# Patient Record
Sex: FEMALE | Race: BLACK OR AFRICAN AMERICAN
[De-identification: names, ages, dates, MRNs, and addresses within clinical notes are randomized per-mention and may not be internally consistent; named-entity substitution may affect disease eponyms.]

---

## 2020-01-14 ENCOUNTER — HOSPITAL ENCOUNTER (INPATIENT)
Dept: HOSPITAL 74 - JLDR | Age: 40
LOS: 3 days | Discharge: HOME | End: 2020-01-17
Attending: OBSTETRICS & GYNECOLOGY | Admitting: OBSTETRICS & GYNECOLOGY
Payer: OTHER GOVERNMENT

## 2020-01-14 VITALS — BODY MASS INDEX: 39 KG/M2

## 2020-01-14 DIAGNOSIS — Z3A.37: ICD-10-CM

## 2020-01-14 DIAGNOSIS — O34.219: Primary | ICD-10-CM

## 2020-01-14 LAB
ANION GAP SERPL CALC-SCNC: 6 MMOL/L (ref 8–16)
APTT BLD: 26.7 SECONDS (ref 25.2–36.5)
BASOPHILS # BLD: 0.4 % (ref 0–2)
BUN SERPL-MCNC: 11.4 MG/DL (ref 7–18)
CALCIUM SERPL-MCNC: 9.3 MG/DL (ref 8.5–10.1)
CHLORIDE SERPL-SCNC: 107 MMOL/L (ref 98–107)
CO2 SERPL-SCNC: 23 MMOL/L (ref 21–32)
CREAT SERPL-MCNC: 0.8 MG/DL (ref 0.55–1.3)
DEPRECATED RDW RBC AUTO: 17 % (ref 11.6–15.6)
EOSINOPHIL # BLD: 1 % (ref 0–4.5)
GLUCOSE SERPL-MCNC: 60 MG/DL (ref 74–106)
HCT VFR BLD CALC: 36.9 % (ref 32.4–45.2)
HGB BLD-MCNC: 12.7 GM/DL (ref 10.7–15.3)
INR BLD: 0.95 (ref 0.83–1.09)
LYMPHOCYTES # BLD: 31.1 % (ref 8–40)
MCH RBC QN AUTO: 26.9 PG (ref 25.7–33.7)
MCHC RBC AUTO-ENTMCNC: 34.5 G/DL (ref 32–36)
MCV RBC: 78.2 FL (ref 80–96)
MONOCYTES # BLD AUTO: 10.2 % (ref 3.8–10.2)
NEUTROPHILS # BLD: 57.3 % (ref 42.8–82.8)
PLATELET # BLD AUTO: 297 K/MM3 (ref 134–434)
PMV BLD: 8.1 FL (ref 7.5–11.1)
POTASSIUM SERPLBLD-SCNC: 4.2 MMOL/L (ref 3.5–5.1)
PT PNL PPP: 11.2 SEC (ref 9.7–13)
RBC # BLD AUTO: 4.72 M/MM3 (ref 3.6–5.2)
SODIUM SERPL-SCNC: 137 MMOL/L (ref 136–145)
WBC # BLD AUTO: 11 K/MM3 (ref 4–10)

## 2020-01-14 RX ADMIN — Medication SCH MLS/HR: at 23:10

## 2020-01-14 NOTE — OP
Operative Note





- Note:


Operative Date: 01/14/20


Pre-Operative Diagnosis: repeat c s in labor


Operation: repeat lt c s


Post-Operative Diagnosis: Same as Pre-op


Surgeon: Drew Titus


Assistant: Carlos Mata


Anesthesiologist/CRNA: Alyssa Arce


Anesthesia: Spinal


Estimated Blood Loss (mls): 700 (no complications )


Operative Report Dictated: Yes

## 2020-01-14 NOTE — HP
Past Medical History





- Admission


Chief Complaint: in labor pain


History Source: Patient


Limitations to Obtaining History: No Limitations





- Past Medical History


CNS: No: Alzheimer's, CVA, Dementia, Migraine, Multiple Sclerosis, Peripheral 

Neuropathy, Parkinson's, Seizure, Syncope, TIA, Vertigo, Other


Cardiovascular: No: AFIB, Aneurysm, Aortic Insufficiency, Aortic Stenosis, CAD, 

CHF, Deep Vein Thrombosis, HTN, Hyperlipdemia, MI, Mitral Insufficiency, Mitral 

Stenosis, Murmur, Pulmonary Hypertension, Other


Pulmonary: No: Asthma, Bronchitis, Cancer, COPD, O2 Dependent, Pneumonia, 

Previously Intubated, Pulmonary Embolus, Pulmonary Fibrosis, Sleep Apnea, Other


Gastrointestinal: No: Ascites, Cancer, Constipation, Crohn's Disease, 

Diverticulitis, Diverticulosis, Esophageal Varices, Gastritis, GERD, GI Bleed, 

Hemorrhoids, Hiatal Hernia, Inflamatory Bowel Disease, Irritable Bowel Disease, 

Pancreatitis, Peptic Ulcer Disease, Ulcerative Colitis, Other


Hepatobiliary: No: Cirrhosis, Cholelithiasis, Cholecystitis, Choledocholithiasis

, Hepatitis A, Hepatitis B, Hepatitis C, Other


Renal/: No: Renal Failure, Renal Inusuff, BPH, Cancer, Hematuria, Hemodialysis

, Neurogenic Bladder, Renal Calculi, UTI, Other


Reproductive: No: Ectopic Pregnancy, Endometriosis, Fibroids, PID, Polycystic 

Ovary Syndrome, Postmenopausal, Other


...: 2


...Para: 1


...Term: 1


...: 0


...Spon : 0


...Induced : 0


...Multiple Gestation: 0


... Weeks Gestation by Dates: 37


...EDC by Sono: 20


Heme/Onc: No: Anemia, B12 Deficiency, Bleeding Disorder, Cancer, Current 

Chemotherapy, Current Radiation Therapy, Hemochromatosis, Hypercoaguable State, 

Myeloproliferative Synd, Sickle Cell Disease, Sickle Cell Trait, 

Thrombocytopenia, Other


Infectious Disease: No: AIDS, C-Diff, Herpes Zoster, HIV, MRSA, STD's, 

Tuberculosis, VREF, Other


Psych: No: Addictions, Anxiety, Bipolar, Depression, Panic, Psychosis, 

Schizophrenia, Other


Musculoskeletal: No: Bursitis, Chronic low back pain, Hemiparesis, Hemiplegia, 

Osteoarthritis, Paraplegia, Other


Rheumatology: No: Fibromyalgia, Gout, Lupus, Rheumatoid Arthritis, Sarcoidosis, 

Vasculitis, Other


ENT: No: Allergic Rhinitis, Sinusitis, Other


Endocrine: No: Orondo's Disease, Cushing's Disease, Diabetes Insipidus, 

Diabetes Mellitus, Hyperparathyroidism, Hyperthyroidism, Hypothyroidism, 

Osteopenia, SIADH, Other


Dermatology: No: Basal Cell, Cellulitis, Eczema, Melanoma, Psoriasis, Squamous 

Cell, Other





- Past Surgical History


Past Surgical History: Yes: 


Hx Myomectomy: No


Hx Transabdominal Cerclage: No





- Advance Directives


Advance Directives: Yes: Living Will





- Smoking History


Smoking history: Never smoked


Have you smoked in the past 12 months: No





- Alcohol/Substance Use


Hx Alcohol Use: No


History of Substance Use: reports: None





- Social History


Usual Living Arrangement: Yes: With Spouse


Do you think of yourself as: Straight/Heterosexual


ADL: Independent


History of Recent Travel: No





Home Medications





- Allergies


Allergies/Adverse Reactions: 


 Allergies











Allergy/AdvReac Type Severity Reaction Status Date / Time


 


No Known Allergies Allergy   Verified 20 17:53














- Home Medications


Home Medications: 


Ambulatory Orders





Acetaminophen/Diphenhydramine [Tylenol Pm Ex-Strength Caplet] 1 each PO PRN PRN 

20 


Pnv No.95/Ferrous Fum/Folic AC [Prenatal Vitamin Tablet] 1 each PO DAILY  











Family Medical History


Family History: Denies





Review of Systems





- Review of Systems


Constitutional: reports: No Symptoms


Eyes: reports: No Symptoms


HENT: reports: No Symptoms


Neck: reports: No Symptoms


Cardiovascular: reports: No Symptoms


Respiratory: reports: No Symptoms


Gastrointestinal: reports: No Symptoms


Genitourinary: reports: No Symptoms


Breasts: reports: No Symptoms Reported


Musculoskeletal: reports: No Symptoms


Integumentary: reports: No Symptoms


Neurological: reports: No Symptoms


Endocrine: reports: No Symptoms


Hematology/Lymphatic: reports: No Symptoms


Psychiatric: reports: No Symptoms


Pain Intensity: 7





Physical Exam - Maternity


Vital Signs: 


 Vital Signs











Temperature  97.9 F   20 17:00


 


Pulse Rate  104 H  20 17:00


 


Respiratory Rate  18   20 17:00


 


Blood Pressure  137/73   20 17:00


 


O2 Sat by Pulse Oximetry (%)      











Constitutional: Yes: Well Nourished, No Distress, Calm


Eyes: Yes: WNL, Conjunctiva Clear, EOM Intact


HENT: Yes: WNL, Atraumatic, Normocephalic


Neck: Yes: WNL, Supple, Trachea Midline


Cardiovascular: Yes: WNL, Regular Rate and Rhythm


Lungs: Clear to auscultation


Breast(s): Yes: WNL





- Abdominal Exam/OB


Fundal Height: 40


Number of Fetuses: Single


Fetal Presentation: Vertex


Contractions: Yes


Regularity: Regular


Intensity: Moderate


Fetal Monitor Mode: External


Fetal Heart Rate (range): 135


Fetal Heart Rate Location: [facility]


Category: I


Accelerations: Uniform


Decelerations: None





- Vaginal Exam/OB


Vaginal Bleediing: No


Speculum Exam: No


Amniotic Membrane Status: Intact


Fetal Presentation: Vertex/Position


Fetal Station: -2





- Physical Exam


Musculoskeletal: Yes: WNL


Extremities: Yes: WNL


Edema: Yes


Edema: LUE: 1+, RUE: 1+, LLE: 1+, RLE: 1+


Integumentary: Yes: WNL


Deep Tendon Reflex Grade: Normal +2


...Motor Strength: WNL


Psychiatric: Yes: WNL, Alert, Oriented





Hemorrhage Risk Assessment





- Risk Factors


Medium Risk Factors: Yes: Prior , uterine surgery,or multiple 

laparotomies


Risk Score: 1


Risk Level: Medium Risk





Assessment/Plan





for repeat lt c s

## 2020-01-15 LAB
BASOPHILS # BLD: 0.4 % (ref 0–2)
DEPRECATED RDW RBC AUTO: 16.8 % (ref 11.6–15.6)
EOSINOPHIL # BLD: 0.4 % (ref 0–4.5)
HCT VFR BLD CALC: 33.8 % (ref 32.4–45.2)
HGB BLD-MCNC: 11.9 GM/DL (ref 10.7–15.3)
LYMPHOCYTES # BLD: 20.2 % (ref 8–40)
MCH RBC QN AUTO: 27.4 PG (ref 25.7–33.7)
MCHC RBC AUTO-ENTMCNC: 35.3 G/DL (ref 32–36)
MCV RBC: 77.6 FL (ref 80–96)
MONOCYTES # BLD AUTO: 7.3 % (ref 3.8–10.2)
NEUTROPHILS # BLD: 71.7 % (ref 42.8–82.8)
PLATELET # BLD AUTO: 224 K/MM3 (ref 134–434)
PMV BLD: 7.9 FL (ref 7.5–11.1)
RBC # BLD AUTO: 4.36 M/MM3 (ref 3.6–5.2)
RPR: NONREACTIVE
WBC # BLD AUTO: 11.9 K/MM3 (ref 4–10)

## 2020-01-15 RX ADMIN — ACETAMINOPHEN PRN MG: 325 TABLET ORAL at 17:33

## 2020-01-15 RX ADMIN — SIMETHICONE CHEW TAB 80 MG PRN MG: 80 TABLET ORAL at 22:53

## 2020-01-15 RX ADMIN — ENOXAPARIN SODIUM SCH MG: 40 INJECTION SUBCUTANEOUS at 10:25

## 2020-01-15 RX ADMIN — IBUPROFEN PRN MG: 600 TABLET, FILM COATED ORAL at 17:33

## 2020-01-15 RX ADMIN — ACETAMINOPHEN PRN MG: 325 TABLET ORAL at 22:54

## 2020-01-15 RX ADMIN — IBUPROFEN PRN MG: 600 TABLET, FILM COATED ORAL at 22:53

## 2020-01-15 NOTE — OP
DATE OF OPERATION:  2020

 

PREOPERATIVE DIAGNOSIS:  Repeat low transverse  section in labor and

bilateral tubal ligation.  

 

POSTOPERATIVE DIAGNOSIS:  Repeat low transverse  section in labor and

bilateral tubal ligation.  

 

PROCEDURE:  Repeat low transverse  section and bilateral tubal ligation.

 

SURGEON:  Noreen Vaughn MD 

 

ASSISTANT:  JANE Aponte

 

ANESTHESIOLOGIST:  Alyssa Arce MD 

 

ANESTHESIA:  Spinal. 

 

INDICATIONS:  A 39-year-old female 37 weeks and 3 days pregnant.  Came into the

hospital in labor with contractions at least 5 minutes and the patient a previous

history of low transverse  section.  Patient is scheduled for next week, next

Friday, for 39 weeks repeat low transverse  section; however, complained

about uterine contractions every 5 minutes since last night since midnight, and the

patient is becoming more uncomfortable.  The patient was seen in the office and was

sent in from the office.  Patient was admitted, and patient was prepared for the

repeat low transverse  section.  All the risks, benefits, and alternatives

explained to the patient including future pregnancy ectopic, nonreversible, and 99%

effective rate only.  Patient understood this is final and declined any other method

of family planning. 

 

DESCRIPTION OF PROCEDURE:  Patient was taken to the OR, placed on the operating room

table in supine position after the spinal anesthesia was obtained.  The patient's

abdomen and pelvis were prepped and draped in the usual sterile manner.  Pfannenstiel

incision was made.  Incision was made through the skin and subcutaneous tissue until

the fascia was nicked in the midline and fascia extended bilaterally. 

Intraperitoneal cavity was entered.  Bladder flap was not created.  Low transverse

segment uterus was entered.  Baby was delivered in SHAGGY position.  Baby was handed

over to pediatrician after umbilical cord doubly clamped and cut.  There was 1 true

knot in the umbilical cord, and the baby was doing well.  Placenta was removed. 

Uterus was closed in single layer, 1st layer interlocking Vicryl suture with good

hemostasis.  Both gutters were cleaned.  Both ovaries were polycystic ovary

appearance, and both tubes were grasped with Danyel and doubly transected and suture

ligated.  Both ends were coagulated with a Bovie.  Good hemostasis.  No

complications.  Tolerated procedure well.  Peritoneum draining clear urine.  Blood

loss about 700 mL.  Peritoneum was closed.  Fascia was closed.  Skin was closed. 

Transferred to recovery room in stable condition.  

 

 

NOREEN VAUGHN MD

 

[id number]

DD: 2020 23:24

DT: 01/15/2020 15:32

Job #:  [id number]

## 2020-01-16 RX ADMIN — Medication SCH: at 02:48

## 2020-01-16 RX ADMIN — IBUPROFEN PRN MG: 600 TABLET, FILM COATED ORAL at 08:45

## 2020-01-16 RX ADMIN — SIMETHICONE CHEW TAB 80 MG PRN MG: 80 TABLET ORAL at 18:59

## 2020-01-16 RX ADMIN — SIMETHICONE CHEW TAB 80 MG PRN MG: 80 TABLET ORAL at 08:47

## 2020-01-16 RX ADMIN — IBUPROFEN PRN MG: 600 TABLET, FILM COATED ORAL at 13:28

## 2020-01-16 RX ADMIN — ENOXAPARIN SODIUM SCH MG: 40 INJECTION SUBCUTANEOUS at 10:16

## 2020-01-16 RX ADMIN — ACETAMINOPHEN PRN MG: 325 TABLET ORAL at 19:00

## 2020-01-16 NOTE — PN
Progress Note (short form)





- Note


Progress Note: 





Anesthesia postop note





38 y/o F s/p spinal anesthesia/ duramorph for  section.


POD#1, vss, aaox3, sensory motor intact distally, pain fairly well controlled.


No anesthesia complications.

## 2020-01-16 NOTE — PN
Post Partum Progress Note


Post Partum Day: 2


Type of Delivery: Repeat C/S


Vital Signs: 


 Vital Signs











Temperature  98.1 F   01/16/20 09:52


 


Pulse Rate  89   01/16/20 09:52


 


Respiratory Rate  20   01/16/20 09:52


 


Blood Pressure  120/76   01/16/20 09:52


 


O2 Sat by Pulse Oximetry (%)  100   01/15/20 00:45











Breast Exam: Yes: Soft


Uterus: Yes: Fundus Firm, Fundus below umbilicus, Non-tender


Incision: Yes: Dressing dry and intact, Sutures intact


Abdomen/GI: Yes: Abdomen soft, Passing flatus, Tolerating PO


Lochia: Yes: Serosa


Lochia, amount: Small


Extremities: Yes: Calves non-tender


Perineum: Yes: Intact


Activity: Ambulating (doing well, dc pt home tomorrow )





- Labs


Labs: 


 CBC











WBC  11.9 K/mm3 (4.0-10.0)  H  01/15/20  08:00    


 


RBC  4.36 M/mm3 (3.60-5.2)   01/15/20  08:00    


 


Hgb  11.9 GM/dL (10.7-15.3)   01/15/20  08:00    


 


Hct  33.8 % (32.4-45.2)   01/15/20  08:00    


 


MCV  77.6 fl (80-96)  L  01/15/20  08:00    


 


MCH  27.4 pg (25.7-33.7)   01/15/20  08:00    


 


MCHC  35.3 g/dl (32.0-36.0)   01/15/20  08:00    


 


RDW  16.8 % (11.6-15.6)  H  01/15/20  08:00    


 


Plt Count  224 K/MM3 (134-434)  D 01/15/20  08:00    


 


MPV  7.9 fl (7.5-11.1)   01/15/20  08:00    


 


Absolute Neuts (auto)  8.5 K/mm3 (1.5-8.0)  H  01/15/20  08:00    


 


Neutrophils %  71.7 % (42.8-82.8)  D 01/15/20  08:00    


 


Lymphocytes %  20.2 % (8-40)  D 01/15/20  08:00    


 


Monocytes %  7.3 % (3.8-10.2)   01/15/20  08:00    


 


Eosinophils %  0.4 % (0-4.5)   01/15/20  08:00    


 


Basophils %  0.4 % (0-2.0)   01/15/20  08:00    


 


Nucleated RBC %  0 % (0-0)   01/15/20  08:00

## 2020-01-16 NOTE — DS
Physical Exam-GYN


Vital Signs: 


 Vital Signs











Temperature  98.1 F   20 09:52


 


Pulse Rate  89   20 09:52


 


Respiratory Rate  20   20 09:52


 


Blood Pressure  120/76   20 09:52


 


O2 Sat by Pulse Oximetry (%)  100   01/15/20 00:45











Constitutional: Yes: Well Nourished, No Distress, Calm


Eyes: Yes: WNL, Conjunctiva Clear, EOM Intact


HENT: Yes: WNL, Atraumatic, Normocephalic


Neck: Yes: WNL, Supple, Trachea Midline


Cardiovascular: Yes: WNL, Regular Rate and Rhythm


Respiratory: Yes: WNL, Regular, CTA Bilaterally


Gastrointestinal: Yes: WNL, Normal Bowel Sounds, Soft


...Rectal Exam: Yes: WNL


Renal/: Yes: WNL


Pelvis: Yes: WNL


External Genitalia: Yes: Normal


Internal Exam Deferred: No


Vaginal Exam: Yes: Normal


Cervix: Yes: Normal


Uterus: Yes: Normal


Adnexa: Normal: Bilateral


....Post Partum: Yes: Uterus firm, Uterus non-tender


Breast(s): Yes: WNL


Musculoskeletal: Yes: WNL


Extremities: Yes: WNL


Edema: Yes


Edema: LUE: 1+, RUE: 1+, LLE: 1+, RLE: 1+


Integumentary: Yes: WNL


Wound/Incision: Yes: Clean/Dry, Well Approximated


Neurological: Yes: WNL, Alert, Oriented


...Motor Strength: WNL


Psychiatric: Yes: WNL, Alert, Oriented


Labs: 


 CBC, BMP





 01/15/20 08:00 





 20 18:00 











Delivery





- Delivery


 Section: Repeat


Type of Anesthesia: Spinal


Episiotomy/Laceration: None


EBL (cc): 700





Delivery, Single Birth





- Stages of Labor


Date 1st Stage Initiatied: 20


Time 1st Stage Initiated: 02:00


Date of Delivery: 20


Time of Delivery: 22:07


Time Placenta Delivered: 22:08





- Condition of Infant


Pediatrician/Neonatologist Present: No


Infant Gender: Female


Birth Weight: 2.75 kg


Position: Left, OA


Total Hours ROM (Hrs/Mins): 2min





- Apgar


  ** 1 Minute


Apgar Total Score: 9





  ** 5 Minutes


Apgar Total Score: 9





- [place] Feeding Plan


Initial Plan: Elected not to breastfeed exclusively throughout hospitalization





Discharge Summary


Problems reviewed: Yes


Reason For Visit: ADMIT LABOR


Procedures: Principal: repeat lt cs


Other Procedures: btl


Hospital Course: 


un eventful 


Health Concerns: 


none 


Plan of Treatment: 


oob 


Goals: 


return to work in 8 weeks 


Condition: Good





- Instructions


Diet, Activity, Other Instructions: 





Physical activity





Resume your normal everyday activity as tolerated no heavy lifting or exercise 

until seen by your surgeon. You may walk unlimited meredith of and climb stairs. 

You may resume driving the car when you feel safe and comfortable behind the 

wheel. No sexual activity as instructed.





Wound care


If you have a bandage, leave it on, and keep dry for 48-72 hours. After that 

time discard the outer bandage. If they are tapes on the skin under the out of 

bandage leave them in place. They will peel off in the next 7 to 10 days. Do 

Not Peel them off. You may shower the day after surgery. If there are tapes 

present on the skin, you may shower over them.





Diet


There are no dietary restrictions. Eat healthy, high-fiber foods. Drink 6 to 8 

glasses of liquid each day. This will assist in keeping your bowels are regular.





Pain management


You may take Tylenol or acetaminophen or Ibuprofen (for example, Motrin, Advil 

etc.) from my pain prescription medication is ordered should be taken as 

prescribed for moderate to severe pain.





Call MD for any of the following:call dr wynne office for 2 weeks follow up 





Severe pain not relieved by medication


Fever of 101 or higher


Excessive bleeding or drainage on dressing


Inability to urinate

















Disposition: HOME





- Home Medications


Comprehensive Discharge Medication List: 


Ambulatory Orders





Acetaminophen/Diphenhydramine [Tylenol Pm Ex-Strength Caplet] 1 each PO PRN PRN 

20 


Pnv No.95/Ferrous Fum/Folic AC [Prenatal Vitamin Tablet] 1 each PO DAILY

## 2020-01-17 VITALS — TEMPERATURE: 98.2 F | SYSTOLIC BLOOD PRESSURE: 124 MMHG | HEART RATE: 88 BPM | DIASTOLIC BLOOD PRESSURE: 68 MMHG

## 2020-01-17 RX ADMIN — IBUPROFEN PRN MG: 600 TABLET, FILM COATED ORAL at 01:11

## 2020-01-17 RX ADMIN — ACETAMINOPHEN PRN MG: 325 TABLET ORAL at 16:14

## 2020-01-17 RX ADMIN — SIMETHICONE CHEW TAB 80 MG PRN MG: 80 TABLET ORAL at 01:12

## 2020-01-17 RX ADMIN — ACETAMINOPHEN PRN MG: 325 TABLET ORAL at 07:45

## 2020-01-17 RX ADMIN — SIMETHICONE CHEW TAB 80 MG PRN MG: 80 TABLET ORAL at 07:46

## 2020-01-17 RX ADMIN — ACETAMINOPHEN PRN MG: 325 TABLET ORAL at 01:11

## 2020-01-17 RX ADMIN — ENOXAPARIN SODIUM SCH MG: 40 INJECTION SUBCUTANEOUS at 10:51

## 2020-01-17 NOTE — PATH
Surgical Pathology Report



Patient Name:  ANDRES KASPER

Accession #:  

[facility]. Rec. #:  [id number]                                                        

   /Age/Gender:  1980 (Age: 39) / F

Account:  [id number]                                                          

             Location: [facility] OBS/GYN

Taken:  2020

Received:  1/15/2020

Reported:  2020

Physicians:  Drew Titus MD

  



Specimen(s) Received

A: PLACENTA 

B: RIGHT PORTION FALLOPIAN TUBE 

C: LEFT PORTION FALLOPIAN TUBE 





Clinical History

, 37.4 weeks gestation, previous  in labor







Final Diagnosis

A. PLACENTA, DELIVERY:

FOCALLY DISRUPTED THIRD TRIMESTER PLACENTA WITH INTERVILLOUS FIBRIN DEPOSITION,

THREE VESSEL UMBILICAL CORD AND UNREMARKABLE PLACENTAL MEMBRANES.  



B. RIGHT FALLOPIAN TUBE, PARTIAL SALPINGECTOMY:

FULL LUMINAL PORTION OF UNREMARKABLE FALLOPIAN TUBE.



C. LEFT FALLOPIAN TUBE, PARTIAL SALPINGECTOMY:

FULL LUMINAL PORTION OF UNREMARKABLE FALLOPIAN TUBE.







***Electronically Signed***

Nelson Payan M.D.





Gross Description

A.  The specimen is received fresh labeled placenta and is a 494 gram, 19.0 x

17.0 x 2.6 cm. placenta with attached membranes and umbilical cord.  The

attached membranes are tan, translucent with focal opacities and insert

marginally.  The umbilical cord measures 20 cm. in length and averages 1 cm. in

diameter. There is an additional 20 cm in length x 0.1 cm in diameter portion of

umbilical cord separately received within the same container. There is a true

knot present in the separately received portion of umbilical cord.  The cord

inserts eccentrically, 4.5 cm. to the nearest margin.  Cut surface of the

umbilical cord reveals 3 vessels. The fetal surface is grey blue with moderate

fibrin deposition and appropriate caliber vessel. The maternal surface is

red-brown with focal defects.  Sectioning reveals red-brown, spongy parenchyma. 

No lesions are identified.  Representative sections are submitted in three

cassettes as follows: 1- membrane rolls and umbilical cord; 2-3- full thickness

sections of placenta.



B. Received in formalin labeled "right portion of fallopian tube," is a 0.7 cm

in length portion of fallopian tube. No fimbria are present. The outer surface

is tan and smooth. Sectioning reveals an unremarkable lumen. Representative

sections are submitted in one cassette.



C. Received in formalin labeled "left portion of fallopian tube," is a 1.8 cm in

length portion of fallopian tube. No fimbria are present. The outer surface is

tan-gray and smooth. Sectioning reveals an unremarkable lumen. Representative

sections are submitted in one cassette.





[facility]

## 2020-01-17 NOTE — PN
Post Partum Progress Note


Post Partum Day: 3


Type of Delivery: Repeat C/S


Vital Signs: 


 Vital Signs











Temperature  98.2 F   01/17/20 10:00


 


Pulse Rate  88   01/17/20 10:00


 


Respiratory Rate  20   01/17/20 10:00


 


Blood Pressure  124/68   01/17/20 10:00


 


O2 Sat by Pulse Oximetry (%)  100   01/15/20 00:45











Breast Exam: Yes: Soft


Uterus: Yes: Fundus Firm, Fundus below umbilicus


Incision: Yes: Dressing dry and intact, Sutures intact


Abdomen/GI: Yes: Abdomen soft, Passing flatus, Tolerating PO


Lochia: Yes: Serosa


Lochia, amount: Small


Extremities: Yes: Calves non-tender


Perineum: Yes: Intact


Activity: Ambulating





- Labs


Labs: 


 CBC











WBC  11.9 K/mm3 (4.0-10.0)  H  01/15/20  08:00    


 


RBC  4.36 M/mm3 (3.60-5.2)   01/15/20  08:00    


 


Hgb  11.9 GM/dL (10.7-15.3)   01/15/20  08:00    


 


Hct  33.8 % (32.4-45.2)   01/15/20  08:00    


 


MCV  77.6 fl (80-96)  L  01/15/20  08:00    


 


MCH  27.4 pg (25.7-33.7)   01/15/20  08:00    


 


MCHC  35.3 g/dl (32.0-36.0)   01/15/20  08:00    


 


RDW  16.8 % (11.6-15.6)  H  01/15/20  08:00    


 


Plt Count  224 K/MM3 (134-434)  D 01/15/20  08:00    


 


MPV  7.9 fl (7.5-11.1)   01/15/20  08:00    


 


Absolute Neuts (auto)  8.5 K/mm3 (1.5-8.0)  H  01/15/20  08:00    


 


Neutrophils %  71.7 % (42.8-82.8)  D 01/15/20  08:00    


 


Lymphocytes %  20.2 % (8-40)  D 01/15/20  08:00    


 


Monocytes %  7.3 % (3.8-10.2)   01/15/20  08:00    


 


Eosinophils %  0.4 % (0-4.5)   01/15/20  08:00    


 


Basophils %  0.4 % (0-2.0)   01/15/20  08:00    


 


Nucleated RBC %  0 % (0-0)   01/15/20  08:00    














Assessment/Plan





dc pt home today